# Patient Record
Sex: FEMALE | Race: WHITE
[De-identification: names, ages, dates, MRNs, and addresses within clinical notes are randomized per-mention and may not be internally consistent; named-entity substitution may affect disease eponyms.]

---

## 2019-10-23 ENCOUNTER — HOSPITAL ENCOUNTER (EMERGENCY)
Dept: HOSPITAL 75 - ER FS | Age: 49
Discharge: HOME | End: 2019-10-23
Payer: COMMERCIAL

## 2019-10-23 VITALS — WEIGHT: 263.45 LBS | HEIGHT: 62.99 IN | BODY MASS INDEX: 46.68 KG/M2

## 2019-10-23 VITALS — SYSTOLIC BLOOD PRESSURE: 137 MMHG | DIASTOLIC BLOOD PRESSURE: 67 MMHG

## 2019-10-23 DIAGNOSIS — A08.4: Primary | ICD-10-CM

## 2019-10-23 LAB
ERYTHROCYTE [DISTWIDTH] IN BLOOD BY AUTOMATED COUNT: 13.2 % (ref 10–14.5)
HCT VFR BLD CALC: 41 % (ref 35–52)
HGB BLD-MCNC: 13.4 G/DL (ref 11.5–16)
MCH RBC QN AUTO: 28 PG (ref 25–34)
MCHC RBC AUTO-ENTMCNC: 33 G/DL (ref 32–36)
MCV RBC AUTO: 86 FL (ref 80–99)
PLATELET # BLD: 379 10^3/UL (ref 130–400)
PMV BLD AUTO: 10.2 FL (ref 7.4–10.4)
WBC # BLD AUTO: 12.8 10^3/UL (ref 4.3–11)

## 2019-10-23 PROCEDURE — 81000 URINALYSIS NONAUTO W/SCOPE: CPT

## 2019-10-23 PROCEDURE — 85027 COMPLETE CBC AUTOMATED: CPT

## 2019-10-23 PROCEDURE — 96361 HYDRATE IV INFUSION ADD-ON: CPT

## 2019-10-23 PROCEDURE — 80053 COMPREHEN METABOLIC PANEL: CPT

## 2019-10-23 PROCEDURE — 74022 RADEX COMPL AQT ABD SERIES: CPT

## 2019-10-23 PROCEDURE — 96374 THER/PROPH/DIAG INJ IV PUSH: CPT

## 2019-10-23 PROCEDURE — 36415 COLL VENOUS BLD VENIPUNCTURE: CPT

## 2019-10-23 PROCEDURE — 83690 ASSAY OF LIPASE: CPT

## 2019-10-23 PROCEDURE — 87804 INFLUENZA ASSAY W/OPTIC: CPT

## 2019-10-23 PROCEDURE — 84443 ASSAY THYROID STIM HORMONE: CPT

## 2019-10-23 NOTE — ED GI
General


Chief Complaint:  Abdominal/GI Problems


Stated Complaint:  SHAKY, DIARRHEA, VOMITING


Source of Information:  Patient





History of Present Illness


Date Seen by Provider:  Oct 23, 2019


Time Seen by Provider:  22:08


Initial Comments


49-year-old female presents with nausea vomiting diarrhea for about 2 days. She 

reports that about 30 minutes ago or so she got very "shaky" patient denies any 

cough, shortness of breath, chest pain. Patient reports she is hasn't felt very 

well for a couple days. Patient reports that when she takes her temperature that

is running low.





Allergies and Home Medications


Allergies


Coded Allergies:  


     No Known Drug Allergies (Unverified , 10/23/19)





Patient Home Medication List


Home Medication List Reviewed:  Yes





Review of Systems


Review of Systems


Constitutional:  chills


EENTM:  No Symptoms Reported


Respiratory:  Denies Cough, Denies Shortness of Air


Cardiovascular:  Denies Chest Pain, Denies Lightheadedness


Gastrointestinal:  Denies Abdominal Pain; Diarrhea, Nausea, Vomiting


Genitourinary:  No Symptoms Reported


Musculoskeletal:  no symptoms reported


Skin:  no symptoms reported





Past Medical-Social-Family Hx


Past Med/Social Hx:  Reviewed Nursing Past Med/Soc Hx


Patient Social History


Recent Foreign Travel:  No


Contact w/Someone Who Travel:  No





Physical Exam


Vital Signs





Vital Signs - First Documented








 10/23/19 10/23/19





 22:10 23:15


 


Temp 36.8 


 


Pulse 107 


 


Resp 20 


 


B/P (MAP) 193/70 (111) 


 


Pulse Ox  100


 


O2 Delivery Room Air 





Capillary Refill :


Height/Weight/BMI


Height: '"


Weight: lbs. oz. kg;  BMI


Method:


General Appearance:  other (diffuse tremors)


HEENT:  PERRL/EOMI


Neck:  supple


Respiratory:  lungs clear, normal breath sounds


Cardiovascular:  normal peripheral pulses, regular rate, rhythm


Gastrointestinal:  non tender, soft


Neurologic/Psychiatric:  alert, normal mood/affect, oriented x 3


Skin:  normal color, warm/dry





Progress/Results/Core Measures


Results/Orders


Lab Results





Laboratory Tests








Test


 10/23/19


22:10 Range/Units


 


 


White Blood Count


 12.8 H


 4.3-11.0


10^3/uL


 


Red Blood Count


 4.72 


 4.35-5.85


10^6/uL


 


Hemoglobin 13.4  11.5-16.0  G/DL


 


Hematocrit 41  35-52  %


 


Mean Corpuscular Volume 86  80-99  FL


 


Mean Corpuscular Hemoglobin 28  25-34  PG


 


Mean Corpuscular Hemoglobin


Concent 33 


 32-36  G/DL





 


Red Cell Distribution Width 13.2  10.0-14.5  %


 


Platelet Count


 379 


 130-400


10^3/uL


 


Mean Platelet Volume 10.2  7.4-10.4  FL








My Orders





Orders - ROSALBA BENITES L DO


Cbc No Diff (10/23/19 22:05)


Comprehensive Metabolic Panel (10/23/19 22:05)


Lipase (10/23/19 22:05)


Ua Culture If Indicated (10/23/19 22:05)


Influenza A And B Antigens (10/23/19 22:05)


Accucheck Stat ONCE (10/23/19 22:05)


Acute Abd Series (10/23/19 22:05)


Ed Iv/Invasive Line Start (10/23/19 22:11)


Ns Iv 1000 Ml (Sodium Chloride 0.9%) (10/23/19 22:11)


Ondansetron Injection (Zofran Injectio (10/23/19 22:15)


Ed Iv/Invasive Line Start (10/23/19 22:11)


Thyroid Analyzer (10/23/19 22:13)





Medications Given in ED





Current Medications








 Medications  Dose


 Ordered  Sig/Frederick


 Route  Start Time


 Stop Time Status Last Admin


Dose Admin


 


 Ondansetron HCl  4 mg  ONCE  ONCE


 IVP  10/23/19 22:15


 10/23/19 22:16 DC 10/23/19 22:25


4 MG


 


 Sodium Chloride  1,000 ml @ 


 0 mls/hr  Q0M ONCE


 IV  10/23/19 22:11


 10/23/19 22:13 DC 10/23/19 22:25


0 MLS/HR








Vital Signs/I&O











 10/23/19 10/23/19





 22:10 23:15


 


Temp 36.8 37.1


 


Pulse 107 98


 


Resp 20 20


 


B/P (MAP) 193/70 (111) 137/67


 


Pulse Ox  100


 


O2 Delivery Room Air Room Air














 10/24/19





 00:00


 


Intake Total 1000 ml


 


Balance 1000 ml











Progress


Progress Note :  


Progress Note


Patient felt significantly better following IV fluids and Zofran. Her tremors 

stopped. She had no episodes of vomiting or diarrhea while here in the ER. 

Patient discharged home in stable condition.





Departure


Impression





   Primary Impression:  


   Viral gastroenteritis


Disposition:  01 HOME, SELF-CARE


Condition:  Stable





Departure-Patient Inst.


Referrals:  


KORY LANIER (PCP)


Primary Care Physician





Add. Discharge Instructions:  


Patient sent home with prescription for Zofran to take as directed on package 


All discharge instructions reviewed with patient and/or family. Voiced 

understanding.











ROSALBA BENITES DO               Oct 23, 2019 22:10

## 2019-10-24 LAB
ALBUMIN SERPL-MCNC: 4.4 GM/DL (ref 3.2–4.5)
ALP SERPL-CCNC: 99 U/L (ref 40–136)
ALT SERPL-CCNC: 32 U/L (ref 0–55)
APTT PPP: YELLOW S
BACTERIA #/AREA URNS HPF: (no result) /HPF
BILIRUB SERPL-MCNC: 0.3 MG/DL (ref 0.1–1)
BILIRUB UR QL STRIP: NEGATIVE
BUN/CREAT SERPL: 28
CALCIUM SERPL-MCNC: 9.5 MG/DL (ref 8.5–10.1)
CHLORIDE SERPL-SCNC: 98 MMOL/L (ref 98–107)
CO2 SERPL-SCNC: 25 MMOL/L (ref 21–32)
CREAT SERPL-MCNC: 0.6 MG/DL (ref 0.6–1.3)
FIBRINOGEN PPP-MCNC: CLEAR MG/DL
GFR SERPLBLD BASED ON 1.73 SQ M-ARVRAT: > 60 ML/MIN
GLUCOSE SERPL-MCNC: 259 MG/DL (ref 70–105)
GLUCOSE UR STRIP-MCNC: (no result) MG/DL
KETONES UR QL STRIP: NEGATIVE
LEUKOCYTE ESTERASE UR QL STRIP: (no result)
LIPASE SERPL-CCNC: 40 U/L (ref 8–78)
NITRITE UR QL STRIP: NEGATIVE
PH UR STRIP: 6 [PH] (ref 5–9)
POTASSIUM SERPL-SCNC: 3.9 MMOL/L (ref 3.6–5)
PROT SERPL-MCNC: 7.5 GM/DL (ref 6.4–8.2)
PROT UR QL STRIP: (no result)
RBC #/AREA URNS HPF: (no result) /HPF
SODIUM SERPL-SCNC: 139 MMOL/L (ref 135–145)
SP GR UR STRIP: 1.01 (ref 1.02–1.02)
SQUAMOUS #/AREA URNS HPF: (no result) /HPF
TSH SERPL DL<=0.05 MIU/L-ACNC: 2.77 UIU/ML (ref 0.35–4.94)
WBC #/AREA URNS HPF: (no result) /HPF

## 2019-10-24 NOTE — DIAGNOSTIC IMAGING REPORT
INDICATION: Dizziness with vomiting and diarrhea since Monday.



COMPARISON: None



TECHNIQUE: 6 views of the chest and abdomen were obtained.



FINDINGS: 

Lung volumes are normal. No focal consolidation or mass. No large

pleural effusion or pneumothorax. The heart size is unremarkable.

The included osseous structures demonstrate no acute

abnormalities.



The bowel gas pattern is nondistended. No large collection of

free intraperitoneal air is seen. Scattered small amounts of gas

and fecal material are present in the colon. No abnormal

extraosseous calcifications are present. The osseous structures

are age-appropriate.



IMPRESSION:

1. No evidence of bowel obstruction or large collection of free

intraperitoneal air. 

2. No acute pleuroparenchymal process.



Dictated by: 



  Dictated on workstation # [id number]

## 2020-05-21 ENCOUNTER — HOSPITAL ENCOUNTER (OUTPATIENT)
Dept: HOSPITAL 75 - LAB | Age: 50
End: 2020-05-21
Attending: SURGERY
Payer: SELF-PAY

## 2020-05-21 ENCOUNTER — HOSPITAL ENCOUNTER (OUTPATIENT)
Dept: HOSPITAL 75 - WOUNDCARE | Age: 50
End: 2020-05-21
Attending: SURGERY
Payer: COMMERCIAL

## 2020-05-21 DIAGNOSIS — L02.31: ICD-10-CM

## 2020-05-21 DIAGNOSIS — E11.622: ICD-10-CM

## 2020-05-21 DIAGNOSIS — F17.218: ICD-10-CM

## 2020-05-21 DIAGNOSIS — E66.01: ICD-10-CM

## 2020-05-21 DIAGNOSIS — E11.622: Primary | ICD-10-CM

## 2020-05-21 DIAGNOSIS — T65.222A: ICD-10-CM

## 2020-05-21 DIAGNOSIS — L98.492: ICD-10-CM

## 2020-05-21 DIAGNOSIS — L98.492: Primary | ICD-10-CM

## 2020-05-21 LAB
BASOPHILS # BLD AUTO: 0.1 10^3/UL (ref 0–0.1)
BASOPHILS NFR BLD AUTO: 1 % (ref 0–10)
EOSINOPHIL # BLD AUTO: 0 10^3/UL (ref 0–0.3)
EOSINOPHIL NFR BLD AUTO: 0 % (ref 0–10)
ERYTHROCYTE [DISTWIDTH] IN BLOOD BY AUTOMATED COUNT: 13.7 % (ref 10–14.5)
HCT VFR BLD CALC: 39 % (ref 35–52)
HGB BLD-MCNC: 13 G/DL (ref 11.5–16)
LYMPHOCYTES # BLD AUTO: 3.9 X 10^3 (ref 1–4)
LYMPHOCYTES NFR BLD AUTO: 31 % (ref 12–44)
MANUAL DIFFERENTIAL PERFORMED BLD QL: NO
MCH RBC QN AUTO: 28 PG (ref 25–34)
MCHC RBC AUTO-ENTMCNC: 33 G/DL (ref 32–36)
MCV RBC AUTO: 84 FL (ref 80–99)
MONOCYTES # BLD AUTO: 1 X 10^3 (ref 0–1)
MONOCYTES NFR BLD AUTO: 8 % (ref 0–12)
NEUTROPHILS # BLD AUTO: 7.5 X 10^3 (ref 1.8–7.8)
NEUTROPHILS NFR BLD AUTO: 60 % (ref 42–75)
PLATELET # BLD: 500 10^3/UL (ref 130–400)
PMV BLD AUTO: 9.6 FL (ref 7.4–10.4)
WBC # BLD AUTO: 12.5 10^3/UL (ref 4.3–11)

## 2020-05-21 PROCEDURE — 11042 DBRDMT SUBQ TIS 1ST 20SQCM/<: CPT

## 2020-05-21 PROCEDURE — 85025 COMPLETE CBC W/AUTO DIFF WBC: CPT

## 2020-05-21 PROCEDURE — 36415 COLL VENOUS BLD VENIPUNCTURE: CPT

## 2020-05-27 ENCOUNTER — HOSPITAL ENCOUNTER (OUTPATIENT)
Dept: HOSPITAL 75 - WOUNDCARE | Age: 50
End: 2020-05-27
Attending: SURGERY
Payer: COMMERCIAL

## 2020-05-27 DIAGNOSIS — T65.222A: ICD-10-CM

## 2020-05-27 DIAGNOSIS — E11.622: Primary | ICD-10-CM

## 2020-05-27 DIAGNOSIS — F41.9: ICD-10-CM

## 2020-05-27 DIAGNOSIS — L98.492: ICD-10-CM

## 2020-05-27 DIAGNOSIS — L02.31: ICD-10-CM

## 2020-05-27 DIAGNOSIS — F17.218: ICD-10-CM

## 2020-05-27 DIAGNOSIS — E66.01: ICD-10-CM

## 2020-05-27 PROCEDURE — 11042 DBRDMT SUBQ TIS 1ST 20SQCM/<: CPT

## 2020-06-03 ENCOUNTER — HOSPITAL ENCOUNTER (OUTPATIENT)
Dept: HOSPITAL 75 - WOUNDCARE | Age: 50
End: 2020-06-03
Attending: SURGERY
Payer: COMMERCIAL

## 2020-06-03 DIAGNOSIS — F17.218: ICD-10-CM

## 2020-06-03 DIAGNOSIS — L02.31: ICD-10-CM

## 2020-06-03 DIAGNOSIS — L98.492: ICD-10-CM

## 2020-06-03 DIAGNOSIS — T65.222A: ICD-10-CM

## 2020-06-03 DIAGNOSIS — E11.52: ICD-10-CM

## 2020-06-03 DIAGNOSIS — I96: ICD-10-CM

## 2020-06-03 DIAGNOSIS — E66.01: ICD-10-CM

## 2020-06-03 DIAGNOSIS — E11.622: Primary | ICD-10-CM

## 2020-06-03 PROCEDURE — 11042 DBRDMT SUBQ TIS 1ST 20SQCM/<: CPT

## 2020-06-10 ENCOUNTER — HOSPITAL ENCOUNTER (OUTPATIENT)
Dept: HOSPITAL 75 - WOUNDCARE | Age: 50
End: 2020-06-10
Attending: SURGERY
Payer: COMMERCIAL

## 2020-06-10 DIAGNOSIS — F17.218: ICD-10-CM

## 2020-06-10 DIAGNOSIS — E66.01: ICD-10-CM

## 2020-06-10 DIAGNOSIS — T65.222A: ICD-10-CM

## 2020-06-10 DIAGNOSIS — E11.622: Primary | ICD-10-CM

## 2020-06-10 DIAGNOSIS — L02.31: ICD-10-CM

## 2020-06-10 DIAGNOSIS — L98.492: ICD-10-CM

## 2020-06-10 PROCEDURE — 11042 DBRDMT SUBQ TIS 1ST 20SQCM/<: CPT

## 2020-06-17 ENCOUNTER — HOSPITAL ENCOUNTER (OUTPATIENT)
Dept: HOSPITAL 75 - WOUNDCARE | Age: 50
End: 2020-06-17
Attending: SURGERY
Payer: COMMERCIAL

## 2020-06-17 DIAGNOSIS — E66.01: ICD-10-CM

## 2020-06-17 DIAGNOSIS — T65.222A: ICD-10-CM

## 2020-06-17 DIAGNOSIS — L02.31: ICD-10-CM

## 2020-06-17 DIAGNOSIS — E11.622: Primary | ICD-10-CM

## 2020-06-17 DIAGNOSIS — F17.218: ICD-10-CM

## 2020-06-17 DIAGNOSIS — L98.492: ICD-10-CM

## 2020-06-17 DIAGNOSIS — F41.9: ICD-10-CM

## 2020-06-17 PROCEDURE — 99213 OFFICE O/P EST LOW 20 MIN: CPT

## 2020-06-24 ENCOUNTER — HOSPITAL ENCOUNTER (OUTPATIENT)
Dept: HOSPITAL 75 - WOUNDCARE | Age: 50
End: 2020-06-24
Attending: SURGERY
Payer: COMMERCIAL

## 2020-06-24 DIAGNOSIS — L02.31: ICD-10-CM

## 2020-06-24 DIAGNOSIS — F17.218: ICD-10-CM

## 2020-06-24 DIAGNOSIS — E66.01: ICD-10-CM

## 2020-06-24 DIAGNOSIS — T65.222A: ICD-10-CM

## 2020-06-24 DIAGNOSIS — L98.492: ICD-10-CM

## 2020-06-24 DIAGNOSIS — E11.622: Primary | ICD-10-CM

## 2020-06-24 PROCEDURE — 99212 OFFICE O/P EST SF 10 MIN: CPT
